# Patient Record
Sex: FEMALE | Race: WHITE | Employment: OTHER | URBAN - METROPOLITAN AREA
[De-identification: names, ages, dates, MRNs, and addresses within clinical notes are randomized per-mention and may not be internally consistent; named-entity substitution may affect disease eponyms.]

---

## 2017-03-23 ENCOUNTER — HOSPITAL ENCOUNTER (OUTPATIENT)
Dept: MAMMOGRAPHY | Age: 62
Discharge: HOME | End: 2017-03-23
Attending: OBSTETRICS & GYNECOLOGY
Payer: COMMERCIAL

## 2017-03-23 DIAGNOSIS — Z12.31 VISIT FOR SCREENING MAMMOGRAM: ICD-10-CM

## 2017-03-23 PROCEDURE — 77067 SCR MAMMO BI INCL CAD: CPT

## 2018-05-09 ENCOUNTER — HOSPITAL ENCOUNTER (OUTPATIENT)
Dept: MAMMOGRAPHY | Age: 63
Discharge: HOME | End: 2018-05-09
Attending: OBSTETRICS & GYNECOLOGY
Payer: COMMERCIAL

## 2018-05-09 DIAGNOSIS — Z12.31 ENCOUNTER FOR SCREENING MAMMOGRAM FOR MALIGNANT NEOPLASM OF BREAST: ICD-10-CM

## 2018-05-09 PROCEDURE — 77067 SCR MAMMO BI INCL CAD: CPT

## 2019-11-22 ENCOUNTER — HOSPITAL ENCOUNTER (OUTPATIENT)
Dept: MAMMOGRAPHY | Age: 64
Discharge: HOME | End: 2019-11-22
Attending: OBSTETRICS & GYNECOLOGY
Payer: COMMERCIAL

## 2019-11-22 DIAGNOSIS — Z12.31 VISIT FOR SCREENING MAMMOGRAM: ICD-10-CM

## 2019-11-22 PROCEDURE — 77063 BREAST TOMOSYNTHESIS BI: CPT

## 2020-11-20 ENCOUNTER — TRANSCRIBE ORDER (OUTPATIENT)
Dept: SCHEDULING | Age: 65
End: 2020-11-20

## 2020-11-20 DIAGNOSIS — Z12.31 VISIT FOR SCREENING MAMMOGRAM: Primary | ICD-10-CM

## 2020-12-15 ENCOUNTER — HOSPITAL ENCOUNTER (OUTPATIENT)
Dept: MAMMOGRAPHY | Age: 65
Discharge: HOME | End: 2020-12-15
Attending: OBSTETRICS & GYNECOLOGY
Payer: MEDICARE

## 2020-12-15 DIAGNOSIS — Z12.31 VISIT FOR SCREENING MAMMOGRAM: ICD-10-CM

## 2020-12-15 PROCEDURE — 77063 BREAST TOMOSYNTHESIS BI: CPT

## 2021-05-25 ENCOUNTER — HOSPITAL ENCOUNTER (OUTPATIENT)
Dept: LAB | Age: 66
Discharge: HOME | End: 2021-05-25

## 2021-05-25 PROCEDURE — 88305 TISSUE EXAM BY PATHOLOGIST: CPT

## 2021-12-16 ENCOUNTER — HOSPITAL ENCOUNTER (OUTPATIENT)
Dept: MAMMOGRAPHY | Age: 66
Discharge: HOME | End: 2021-12-16
Attending: FAMILY MEDICINE
Payer: MEDICARE

## 2021-12-16 DIAGNOSIS — Z78.0 MENOPAUSE: ICD-10-CM

## 2021-12-16 DIAGNOSIS — Z12.31 ENCOUNTER FOR SCREENING MAMMOGRAM FOR MALIGNANT NEOPLASM OF BREAST: ICD-10-CM

## 2021-12-16 PROCEDURE — 77080 DXA BONE DENSITY AXIAL: CPT

## 2021-12-16 PROCEDURE — 77067 SCR MAMMO BI INCL CAD: CPT

## 2021-12-17 NOTE — PROGRESS NOTES
Let patient know bone density test showed osteopenia which is thinning bones but not osteoporosis yet. Be sure to take calcium 500 mg daily and walk for exercise as this helps rebuild bone. We will repeat this in 2 years.

## 2022-03-09 ENCOUNTER — TRANSCRIBE ORDER (OUTPATIENT)
Dept: SCHEDULING | Age: 67
End: 2022-03-09

## 2022-03-09 DIAGNOSIS — Z12.31 SCREENING MAMMOGRAM FOR HIGH-RISK PATIENT: Primary | ICD-10-CM

## 2022-12-30 ENCOUNTER — HOSPITAL ENCOUNTER (OUTPATIENT)
Dept: MAMMOGRAPHY | Age: 67
Discharge: HOME | End: 2022-12-30
Payer: MEDICARE

## 2022-12-30 DIAGNOSIS — Z12.31 ENCOUNTER FOR SCREENING MAMMOGRAM FOR BREAST CANCER: ICD-10-CM

## 2022-12-30 PROCEDURE — 77063 BREAST TOMOSYNTHESIS BI: CPT

## 2023-03-08 ENCOUNTER — OFFICE VISIT (OUTPATIENT)
Dept: FAMILY MEDICINE CLINIC | Facility: CLINIC | Age: 68
End: 2023-03-08

## 2023-03-08 VITALS
WEIGHT: 122 LBS | RESPIRATION RATE: 16 BRPM | OXYGEN SATURATION: 98 % | HEIGHT: 63 IN | DIASTOLIC BLOOD PRESSURE: 85 MMHG | TEMPERATURE: 97.3 F | SYSTOLIC BLOOD PRESSURE: 154 MMHG | BODY MASS INDEX: 21.62 KG/M2 | HEART RATE: 67 BPM

## 2023-03-08 DIAGNOSIS — Z23 NEED FOR PNEUMOCOCCAL VACCINATION: ICD-10-CM

## 2023-03-08 DIAGNOSIS — Z12.31 SCREENING MAMMOGRAM FOR HIGH-RISK PATIENT: ICD-10-CM

## 2023-03-08 DIAGNOSIS — M54.2 NECK PAIN: ICD-10-CM

## 2023-03-08 DIAGNOSIS — Z00.00 MEDICARE ANNUAL WELLNESS VISIT, SUBSEQUENT: Primary | ICD-10-CM

## 2023-03-08 RX ORDER — MELOXICAM 7.5 MG/1
7.5 TABLET ORAL DAILY
Qty: 30 TABLET | Refills: 3 | Status: SHIPPED | OUTPATIENT
Start: 2023-03-08

## 2023-03-08 SDOH — ECONOMIC STABILITY: INCOME INSECURITY: HOW HARD IS IT FOR YOU TO PAY FOR THE VERY BASICS LIKE FOOD, HOUSING, MEDICAL CARE, AND HEATING?: NOT HARD AT ALL

## 2023-03-08 SDOH — ECONOMIC STABILITY: FOOD INSECURITY: WITHIN THE PAST 12 MONTHS, YOU WORRIED THAT YOUR FOOD WOULD RUN OUT BEFORE YOU GOT MONEY TO BUY MORE.: NEVER TRUE

## 2023-03-08 SDOH — ECONOMIC STABILITY: HOUSING INSECURITY
IN THE LAST 12 MONTHS, WAS THERE A TIME WHEN YOU DID NOT HAVE A STEADY PLACE TO SLEEP OR SLEPT IN A SHELTER (INCLUDING NOW)?: NO

## 2023-03-08 SDOH — ECONOMIC STABILITY: FOOD INSECURITY: WITHIN THE PAST 12 MONTHS, THE FOOD YOU BOUGHT JUST DIDN'T LAST AND YOU DIDN'T HAVE MONEY TO GET MORE.: NEVER TRUE

## 2023-03-08 ASSESSMENT — PATIENT HEALTH QUESTIONNAIRE - PHQ9
SUM OF ALL RESPONSES TO PHQ QUESTIONS 1-9: 0
2. FEELING DOWN, DEPRESSED OR HOPELESS: 0
SUM OF ALL RESPONSES TO PHQ QUESTIONS 1-9: 0
SUM OF ALL RESPONSES TO PHQ QUESTIONS 1-9: 0
1. LITTLE INTEREST OR PLEASURE IN DOING THINGS: 0
SUM OF ALL RESPONSES TO PHQ9 QUESTIONS 1 & 2: 0
SUM OF ALL RESPONSES TO PHQ QUESTIONS 1-9: 0

## 2023-03-08 ASSESSMENT — LIFESTYLE VARIABLES
HOW MANY STANDARD DRINKS CONTAINING ALCOHOL DO YOU HAVE ON A TYPICAL DAY: 1 OR 2
HOW OFTEN DO YOU HAVE A DRINK CONTAINING ALCOHOL: 2-3 TIMES A WEEK

## 2023-03-08 NOTE — PROGRESS NOTES
Medicare Annual Wellness Visit    Lucia Bermudez is here for Annual Exam    Assessment & Plan   Medicare annual wellness visit, subsequent  -     External Referral to Ophthalmology  Screening mammogram for high-risk patient  -     MECHELLE DIGITAL SCREEN W OR WO CAD BILATERAL; Future  Need for pneumococcal vaccination  -     Pneumococcal, PCV20, PREVNAR 20, (age 25 yrs+), IM, PF  Neck pain  -     [address] - Physical Therapy, [facility]  -     meloxicam (MOBIC) 7.5 MG tablet; Take 1 tablet by mouth daily, Disp-30 tablet, R-3Normal      Recommendations for Preventive Services Due: see orders and patient instructions/AVS.  Recommended screening schedule for the next 5-10 years is provided to the patient in written form: see Patient Instructions/AVS.     Return in 4 weeks (on 4/5/2023) for Medicare Annual Wellness Visit in 1 year. Subjective   The following acute and/or chronic problems were also addressed today:  Patient has had some left-sided neck pain. It really is more in the top of her shoulder. It does not go down into her arm or hand. She has no numbness or tingling or weakness. Is been going on for several months. Advil only helps a little bit. She has not injured herself in any way. She also has a mole on her right back she would like for me to look at. She is also had some vaginal dryness and she is wondering about over-the-counter remedies for that. Patient will go to physical therapy about her neck and upper supraspinatus area on the left. Meloxicam daily. Side effects risk given and gone over. Follow-up 1 month about that. Keep a watch on her blood pressure. We will recheck that when she returns. Mole on her back appears to be a cherry angioma. We will keep an eye on it. Patient's complete Health Risk Assessment and screening values have been reviewed and are found in Flowsheets.  The following problems were reviewed today and where indicated follow up appointments were made and/or referrals ordered. Positive Risk Factor Screenings with Interventions:                    Vision Screen:  Do you have difficulty driving, watching TV, or doing any of your daily activities because of your eyesight?: No  Have you had an eye exam within the past year?: (!) No  No results found. Interventions: Will make referral to ophthalmology. Advanced Directives:  Do you have a Living Will?: (!) No    Intervention:  has NO advanced directive - information provided                       Objective   Vitals:    03/08/23 0808   BP: (!) 154/85   Pulse: 67   Resp: 16   Temp: 97.3 °F (36.3 °C)   SpO2: 98%   Weight: 122 lb (55.3 kg)   Height: 5' 3\" (1.6 m)      Body mass index is 21.61 kg/m². Neck: neck supple and non tender without mass, no thyromegaly or thyroid nodules, no cervical lymphadenopathy   Pulmonary/Chest: clear to auscultation bilaterally- no wheezes, rales or rhonchi, normal air movement, no respiratory distress  Cardiovascular: normal rate, normal S1 and S2, and no gallops  Patient is very tender and has muscle spasming in the left supraspinatus area. She can fully extend at the shoulder without difficulty. She can flex and extend and turn her neck in all motions. It does not make the pain worse. Half of a pencil eraser sized red mole on right mid back that appears benign. No Known Allergies  Prior to Visit Medications    Medication Sig Taking?  Authorizing Provider   meloxicam (MOBIC) 7.5 MG tablet Take 1 tablet by mouth daily Yes Jimmie Jack MD       CareTeam (Including outside providers/suppliers regularly involved in providing care):   Patient Care Team:  Jimmie Jack MD as PCP - Lora Pete MD as PCP - Empaneled Provider     Reviewed and updated this visit:  Allergies  Meds  Med Hx  Surg Hx  Soc Hx  Fam Hx             Jimmie Jack MD

## 2023-03-08 NOTE — PATIENT INSTRUCTIONS
Learning About Vision Tests  What are vision tests? The four most common vision tests are visual acuity tests, refraction, visual field tests, and color vision tests. Visual acuity (sharpness) tests  These tests are used: To see if you need glasses or contact lenses. To monitor an eye problem. To check an eye injury. Visual acuity tests are done as part of routine exams. You may also have this test when you get your 's license or apply for some types of jobs. Visual field tests  These tests are used: To check for vision loss in any area of your range of vision. To screen for certain eye diseases. To look for nerve damage after a stroke, head injury, or other problem that could reduce blood flow to the brain. Refraction and color tests  A refraction test is done to find the right prescription for glasses and contact lenses. A color vision test is done to check for color blindness. Color vision is often tested as part of a routine exam. You may also have this test when you apply for a job where recognizing different colors is important, such as , electronics, or the [employer]. How are vision tests done? Visual acuity test   You cover one eye at a time. You read aloud from a wall chart across the room. You read aloud from a small card that you hold in your hand. Refraction   You look into a special device. The device puts lenses of different strengths in front of each eye to see how strong your glasses or contact lenses need to be. Visual field tests   Your doctor may have you look through special machines. Or your doctor may simply have you stare straight ahead while they move a finger into and out of your field of vision. Color vision test   You look at pieces of printed test patterns in various colors. You say what number or symbol you see. Your doctor may have you trace the number or symbol using a pointer. How do these tests feel?   There is very little chance of having a problem from this test. If dilating drops are used for a vision test, they may make the eyes sting and cause a medicine taste in the mouth. Follow-up care is a key part of your treatment and safety. Be sure to make and go to all appointments, and call your doctor if you are having problems. It's also a good idea to know your test results and keep a list of the medicines you take. Where can you learn more? Go to http://www.meier.com/ and enter G551 to learn more about \"Learning About Vision Tests. \"  Current as of: October 12, 2022               Content Version: 13.5  © 1889-4718 [employer]. Care instructions adapted under license by [facility] ([facility]). If you have questions about a medical condition or this instruction, always ask your healthcare professional. [address] any warranty or liability for your use of this information. Advance Directives: Care Instructions  Overview  An advance directive is a legal way to state your wishes at the end of your life. It tells your family and your doctor what to do if you can't say what you want. There are two main types of advance directives. You can change them any time your wishes change. Living will. This form tells your family and your doctor your wishes about life support and other treatment. The form is also called a declaration. Medical power of . This form lets you name a person to make treatment decisions for you when you can't speak for yourself. This person is called a health care agent (health care proxy, health care surrogate). The form is also called a durable power of  for health care. If you do not have an advance directive, decisions about your medical care may be made by a family member, or by a doctor or a  who doesn't know you. It may help to think of an advance directive as a gift to the people who care for you.  If you have one, they won't have to make tough decisions by themselves. For more information, including forms for your state, see the [address] website (www.caringinfo.org/planning/advance-directives/). Follow-up care is a key part of your treatment and safety. Be sure to make and go to all appointments, and call your doctor if you are having problems. It's also a good idea to know your test results and keep a list of the medicines you take. What should you include in an advance directive? Many states have a unique advance directive form. (It may ask you to address specific issues.) Or you might use a universal form that's approved by many states. If your form doesn't tell you what to address, it may be hard to know what to include in your advance directive. Use the questions below to help you get started. Who do you want to make decisions about your medical care if you are not able to? What life-support measures do you want if you have a serious illness that gets worse over time or can't be cured? What are you most afraid of that might happen? (Maybe you're afraid of having pain, losing your independence, or being kept alive by machines.)  Where would you prefer to die? (Your home? A hospital? A nursing home?)  Do you want to donate your organs when you die? Do you want certain Spiritism practices performed before you die? When should you call for help? Be sure to contact your doctor if you have any questions. Where can you learn more? Go to http://www.meier.com/ and enter R264 to learn more about \"Advance Directives: Care Instructions. \"  Current as of: June 16, 2022               Content Version: 13.5  © 7870-9152 [employer]. Care instructions adapted under license by [employer]. If you have questions about a medical condition or this instruction, always ask your healthcare professional. [address] any warranty or liability for your use of this information.            A Healthy Heart: Care Instructions  Your Care Instructions     Coronary artery disease, also called heart disease, occurs when a substance called plaque builds up in the vessels that supply oxygen-rich blood to your heart muscle. This can narrow the blood vessels and reduce blood flow. A heart attack happens when blood flow is completely blocked. A high-fat diet, smoking, and other factors increase the risk of heart disease. Your doctor has found that you have a chance of having heart disease. You can do lots of things to keep your heart healthy. It may not be easy, but you can change your diet, exercise more, and quit smoking. These steps really work to lower your chance of heart disease. Follow-up care is a key part of your treatment and safety. Be sure to make and go to all appointments, and call your doctor if you are having problems. It's also a good idea to know your test results and keep a list of the medicines you take. How can you care for yourself at home? Diet    Use less salt when you cook and eat. This helps lower your blood pressure. Taste food before salting. Add only a little salt when you think you need it. With time, your taste buds will adjust to less salt.     Eat fewer snack items, fast foods, canned soups, and other high-salt, high-fat, processed foods.     Read food labels and try to avoid saturated and trans fats. They increase your risk of heart disease by raising cholesterol levels.     Limit the amount of solid fat-butter, margarine, and shortening-you eat. Use olive, peanut, or canola oil when you cook. Bake, broil, and steam foods instead of frying them.     Eat a variety of fruit and vegetables every day. Dark green, deep orange, red, or yellow fruits and vegetables are especially good for you. Examples include spinach, carrots, peaches, and berries.     Foods high in fiber can reduce your cholesterol and provide important vitamins and minerals.  High-fiber foods include whole-grain cereals and breads, oatmeal, beans, brown rice, citrus fruits, and apples.     Eat lean proteins. Heart-healthy proteins include seafood, lean meats and poultry, eggs, beans, peas, nuts, seeds, and soy products.     Limit drinks and foods with added sugar. These include candy, desserts, and soda pop. Lifestyle changes    If your doctor recommends it, get more exercise. Walking is a good choice. Bit by bit, increase the amount you walk every day. Try for at least 30 minutes on most days of the week. You also may want to swim, bike, or do other activities.     Do not smoke. If you need help quitting, talk to your doctor about stop-smoking programs and medicines. These can increase your chances of quitting for good. Quitting smoking may be the most important step you can take to protect your heart. It is never too late to quit.     Limit alcohol to 2 drinks a day for men and 1 drink a day for women. Too much alcohol can cause health problems.     Manage other health problems such as diabetes, high blood pressure, and high cholesterol. If you think you may have a problem with alcohol or drug use, talk to your doctor. Medicines    Take your medicines exactly as prescribed. Call your doctor if you think you are having a problem with your medicine.     If your doctor recommends aspirin, take the amount directed each day. Make sure you take aspirin and not another kind of pain reliever, such as acetaminophen (Tylenol). When should you call for help? Call 911 if you have symptoms of a heart attack. These may include:    Chest pain or pressure, or a strange feeling in the chest.     Sweating.     Shortness of breath.     Pain, pressure, or a strange feeling in the back, neck, jaw, or upper belly or in one or both shoulders or arms.     Lightheadedness or sudden weakness.     A fast or irregular heartbeat. After you call 911, the  may tell you to chew 1 adult-strength or 2 to 4 low-dose aspirin. Wait for an ambulance.  Do not try to drive yourself. Watch closely for changes in your health, and be sure to contact your doctor if you have any problems. Where can you learn more? Go to http://www.meier.com/ and enter F075 to learn more about \"A Healthy Heart: Care Instructions. \"  Current as of: September 7, 2022               Content Version: 13.5  © 2006-2022 [employer]. Care instructions adapted under license by [facility] ([facility]). If you have questions about a medical condition or this instruction, always ask your healthcare professional. [address] any warranty or liability for your use of this information. Personalized Preventive Plan for Magda Gautam - 3/8/2023  Medicare offers a range of preventive health benefits. Some of the tests and screenings are paid in full while other may be subject to a deductible, co-insurance, and/or copay. Some of these benefits include a comprehensive review of your medical history including lifestyle, illnesses that may run in your family, and various assessments and screenings as appropriate. After reviewing your medical record and screening and assessments performed today your provider may have ordered immunizations, labs, imaging, and/or referrals for you. A list of these orders (if applicable) as well as your Preventive Care list are included within your After Visit Summary for your review. Other Preventive Recommendations:    A preventive eye exam performed by an eye specialist is recommended every 1-2 years to screen for glaucoma; cataracts, macular degeneration, and other eye disorders. A preventive dental visit is recommended every 6 months. Try to get at least 150 minutes of exercise per week or 10,000 steps per day on a pedometer . Order or download the FREE \"Exercise & Physical Activity: Your Everyday Guide\" from [employer]. Call 8-356.609.9431 or search [employer] online.   You need 9719-2493 mg of calcium and 2037-7645 IU of vitamin D per day. It is possible to meet your calcium requirement with diet alone, but a vitamin D supplement is usually necessary to meet this goal.  When exposed to the sun, use a sunscreen that protects against both UVA and UVB radiation with an SPF of 30 or greater. Reapply every 2 to 3 hours or after sweating, drying off with a towel, or swimming. Always wear a seat belt when traveling in a car. Always wear a helmet when riding a bicycle or motorcycle.

## 2023-03-10 ENCOUNTER — HOSPITAL ENCOUNTER (OUTPATIENT)
Dept: PHYSICAL THERAPY | Age: 68
Setting detail: RECURRING SERIES
Discharge: HOME | End: 2023-03-13
Payer: MEDICARE

## 2023-03-10 PROCEDURE — 97140 MANUAL THERAPY 1/> REGIONS: CPT

## 2023-03-10 PROCEDURE — 97161 PT EVAL LOW COMPLEX 20 MIN: CPT

## 2023-03-10 PROCEDURE — 97110 THERAPEUTIC EXERCISES: CPT

## 2023-03-10 ASSESSMENT — PAIN SCALES - GENERAL: PAINLEVEL_OUTOF10: 1

## 2023-03-10 NOTE — THERAPY EVALUATION
Patrick Miguel  : 1955  Primary: Medicare Part A And B (Medicare)  Secondary: [address] @ [address] [facility] [address]  Phone: [phone]  Fax: [phone] Plan Frequency: 1-2 visits per week for 6 weeks  Plan of Care/Certification Expiration Date: 23    PT Visit Info:  Plan Frequency: 1-2 visits per week for 6 weeks  Plan of Care/Certification Expiration Date: 23    Visit Count:  1                OUTPATIENT PHYSICAL THERAPY:             OP NOTE TYPE: Initial Assessment 3/10/2023               Episode (Neck pain) Appt Desk         Treatment Diagnosis:  Cervicalgia (M54.2)  Abnormal posture (R29.3)  Medical/Referring Diagnosis:  Neck pain [M54.2]  Referring Physician:  Corine Dillon MD MD Orders:  PT Eval and Treat   Return MD Appt:  TBD  Date of Onset:  Onset Date:  (6 months ago)    Allergies:  Patient has no known allergies. Restrictions/Precautions:    Restrictions/Precautions: None      Medications Last Reviewed:  3/10/2023     SUBJECTIVE   History of Injury/Illness (Reason for Referral): Insidious onset roughly 6 months ago, but did slip around [place] on some stairs while helping a friend and felt that it aggravated it. Does  yoga or pilates every morning and noticed that it was sore. Looking up after looking down to read has pain. Feels a knot on the L side. No numbness/tingling in hands, but has Reynaud's. Not terribly functionally limiting but is aggravating. Feels worse when stressed. Patient is R handed. Sometimes has difficulty sleeping/staying asleep due to discomfort. Patient Stated Goal(s):  \"Reduce discomfort\"  Initial:     1/10 Post Session:      (None reported, \"Feels better')/10  Past Medical History/Comorbidities: per EMR  Ms. Jessica Mina  has a past medical history of Raynaud disease. Ms. Jessica Mina  has a past surgical history that includes Breast biopsy (Right, ).   Social History/Living Environment:   Lives With: Spouse  Type of Home: House  Home Layout: Two level     Prior Level of Function/Work/Activity:   Prior level of function: Independent  Occupation: Retired     Education: >4 years of college     Learning:   Does the patient/guardian have any barriers to learning?: No barriers  Will there be a co-learner?: No  What is the preferred language of the patient/guardian?: English  Is an  required?: No  How does the patient/guardian prefer to learn new concepts?: Listening; Reading; Demonstration; Pictures/Videos     Fall Risk Scale: Spencer Total Score: 0  Spencer Fall Risk: Low (0-24)           OBJECTIVE   Observations:    Description: Scapular motion appears symmetrical with visual observation with active abduction and flexion to end-range bilaterally. Slightly kyphotic posture. Palpation:  Tenderness and tightness noted to L upper trapezius with palpable muscle knot. Tender to joint mobilizations at C3-C4 to L side. Stiff with posterior-anterior joint mobilizations at T2-T3-T4-T5. Cervical:  AROM Cervical Spine   Cervical spine general AROM: Rotation restricted approxomately 30% to the left; approximately 20% to the right. Flexion and extension are within functional limits. Thoracic:  AROM Thoracic Spine   Thoracic Spine AROM : [facility]  Thoracic spine general AROM: Seated thoracic rotation symmetrical to each side. Extension and flexion are within normal ranges.   Cervical and UE Strength  L Shoulder Flexion: 5/5  L Shoulder ABduction: 5/5  L Shoulder Internal Rotation: 5/5  L Shoulder External Rotation: 5/5  Shoulder:  AROM Shoulder (Degrees)  R Shoulder Flexion (0-180): 155  R Shoulder ABduction (0-180): 160  R Shoulder Int Rotation  (0-70): T9-T10  R Shoulder Ext Rotation (0-90): T1-T2  L Shoulder Flexion (0-180): 155  L Shoulder ABduction (0-180): 165  L Shoulder Int Rotation  (0-70): T8-T9  L Shoulder Ext Rotation  (0-90): T1-T2  Shoulder Elbow Strength Testing (MMT)  L Shoulder Flexion: 5/5  L Shoulder ABduction: 5/5  L Shoulder Internal Rotation: 5/5  L Shoulder External Rotation: 5/5  ASSESSMENT   Initial Assessment:  Patient presents to PT with chief complaint of L-sided neck pain that aggravates her when performing everyday activities. She exhibits a palpable trigger point to L upper trapezius, slight reduction in cervical range of motion and some segmental mobility limitations with thoracic mobilizations. She is likely to benefit from PT intervention to reduce tightness to L upper trapezius and improve her quality of life. Problem List: (Impacting functional limitations): Body Structures, Functions, Activity Limitations Requiring Skilled Therapeutic Intervention: Decreased ROM; Increased pain; Decreased posture     Therapy Prognosis:   Therapy Prognosis: Good     Initial Assessment Complexity:   Decision Making: Low Complexity    PLAN   Effective Dates: 3/10/23 TO Plan of Care/Certification Expiration Date: 04/21/23   Frequency/Duration: Plan Frequency: 1-2 visits per week for 6 weeks   Interventions Planned (Treatment may consist of any combination of the following):    Current Treatment Recommendations: Strengthening; ROM; Dry needling; Modalities; Neuromuscular re-education; Manual; Home exercise program; Patient/Caregiver education & training     Goals: (Goals have been discussed and agreed upon with patient.)    Discharge Goals: Time Frame: 6 weeks  Patient will have symmetrical cervical rotation ROM, and be able to look over each shoulder with functional range of motion. Patient will be independent with [facility] for symptom management. Patient will be able to perform yoga and/or pilates without limitation from neck pain. Outcome Measure: Tool Used: Neck Disability Index (NDI)  Score:  Initial: 4/50  Most Recent: X/50 (Date: -- )   Interpretation of Score:  The Neck Disability Index is a revised form of the Oswestry Low Back Pain Index and is designed to measure the activities of daily living in person's with neck pain. Each section is scored on a 0-5 scale, 5 representing the greatest disability. The scores of each section are added together for a total score of 50. Medical Necessity:   > Skilled intervention continues to be required due to L-sided neck/upper quarter pain. Reason For Services/Other Comments:  > Patient continues to require skilled intervention due to neck pain. Total Duration: 50 minutesT  Time In: 0900  Time Out: 5767    Regarding Lorenaraymond Saul's therapy, I certify that the treatment plan above will be carried out by a therapist or under their direction.   Thank you for this referral,  Jeannette Gomez, PT     Referring Physician Signature: Erick Conklin MD _______________________________ Date _____________        Post Session Pain  Charge Capture  PT Visit Info MD Guidelines  Meng

## 2023-03-12 NOTE — PROGRESS NOTES
Brett Anne  : 1955  Primary: Medicare Part A And B (Medicare)  Secondary: [address] @ [address] [facility] [address]  Phone: [phone]  Fax: [phone] Plan Frequency: 1-2 visits per week for 6 weeks    Plan of Care/Certification Expiration Date: 23      PT Visit Info:  Plan Frequency: 1-2 visits per week for 6 weeks  Plan of Care/Certification Expiration Date: 23      Visit Count:  1    OUTPATIENT PHYSICAL THERAPY:OP NOTE TYPE: Treatment Note 3/10/2023       Episode  }Appt Desk             Treatment Diagnosis:  Cervicalgia (M54.2)  Abnormal posture (R29.3)  Medical/Referring Diagnosis:  Neck pain [M54.2]  Referring Physician:  Lanie Crigler, MD MD Orders:  PT Eval and Treat   Date of Onset:  Onset Date:  (6 months ago)     Allergies:   Patient has no known allergies. Restrictions/Precautions:  Restrictions/Precautions: None  No data recorded     Interventions Planned (Treatment may consist of any combination of the following):    Current Treatment Recommendations: Strengthening; ROM; Dry needling; Modalities; Neuromuscular re-education; Manual; Home exercise program; Patient/Caregiver education & training     Subjective Comments:  See evaluation note for details. Initial:}    1/10Post Session:        (None reported, \"Feels better')/10  Medications Last Reviewed:  3/10/2023  Updated Objective Findings:  See evaluation note from today  Treatment   THERAPEUTIC EXERCISE: (10 minutes):    Exercises per grid below to improve  tissue pliability, reduce L upper trapezius tightness and improve scapular positioning . Required minimal verbal and manual cues to promote proper body posture and promote proper body mechanics. Progressed repetitions as indicated.   - L upper trapezius stretch in supine; contract-relax to L upper trapezius in side-lying  - L sided scapular diagonals for reduced upper trapezius tightness  MANUAL THERAPY: (15 minutes):   Joint mobilization and Soft tissue mobilization was utilized and necessary because of the patient's restricted motion of soft tissue and 1st rib hypomobility . Soft tissue mobilizations to L upper trapezius and L sided cervical paraspinals. Grade 3-4 mobilizations to improve L sided 1st rib mobility. Date:  3/10/23 Date:   Date:     Activity/Exercise Parameters Parameters Parameters   Upper trapezius stretch Seated, 3 x 20\"     Lower trapezius stretch Seated  3 x 20\"     Scapular retractions 2 x 10                                 Treatment/Session Summary:    Treatment Assessment:  Patient did well with exercises. Tight to L upper trapezius with palpable trigger point. Communication/Consultation:  None today  Equipment provided today:  None  Recommendations/Intent for next treatment session: Next visit will focus on improving L sided neck pain.     Total Treatment Billable Duration:  25 minutes for treatment + 25 minutes for evaluation  Time In: 0900  Time Out: 0950    Willi Duran, PT       Charge Capture  }Post Session Pain  PT Visit Info  MedBridge Portal  MD Guidelines  Scanned Media  Benefits  MyChart    Future Appointments   Date Time Provider Kennedy Paiz   3/16/2023  8:00 AM Ananth Aelshia, PTA [facility]   3/17/2023  8:00 AM Sonjaandre Claytonada, PT [facility]   3/20/2023  8:00 AM Sonja Harada, PT [facility]   3/23/2023  8:00 AM Sonja Harada, PT [facility]   3/30/2023  8:00 AM Ananth Aleshia, PTA [facility]   3/31/2023  8:00 AM Sonja Harada, PT [facility]   4/3/2023  8:00 AM Ananth Aleshia, PTA [facility]   4/5/2023  8:45 AM MD MAGDY Lara [facility]   4/6/2023  9:00 AM Tamela Harada, PT [facility]   3/8/2024  8:00 AM MD MAGDY Lara [facility] AMB

## 2023-03-16 ENCOUNTER — HOSPITAL ENCOUNTER (OUTPATIENT)
Dept: PHYSICAL THERAPY | Age: 68
Setting detail: RECURRING SERIES
Discharge: HOME | End: 2023-03-19
Payer: MEDICARE

## 2023-03-16 PROCEDURE — 97140 MANUAL THERAPY 1/> REGIONS: CPT

## 2023-03-16 PROCEDURE — 97035 APP MDLTY 1+ULTRASOUND EA 15: CPT

## 2023-03-16 PROCEDURE — 97110 THERAPEUTIC EXERCISES: CPT

## 2023-03-16 ASSESSMENT — PAIN SCALES - GENERAL: PAINLEVEL_OUTOF10: 2

## 2023-03-17 ENCOUNTER — HOSPITAL ENCOUNTER (OUTPATIENT)
Dept: PHYSICAL THERAPY | Age: 68
Setting detail: RECURRING SERIES
Discharge: HOME | End: 2023-03-20
Payer: MEDICARE

## 2023-03-17 ENCOUNTER — TELEPHONE (OUTPATIENT)
Dept: FAMILY MEDICINE CLINIC | Facility: CLINIC | Age: 68
End: 2023-03-17

## 2023-03-17 PROCEDURE — 97140 MANUAL THERAPY 1/> REGIONS: CPT

## 2023-03-17 PROCEDURE — 97110 THERAPEUTIC EXERCISES: CPT

## 2023-03-17 NOTE — TELEPHONE ENCOUNTER
Did speak to the patient regarding the mole that I saw on her back the week before last.  I would like for her to return to have it removed. She will call and get an appointment.

## 2023-03-17 NOTE — PROGRESS NOTES
tightness    MANUAL THERAPY: ( x 15  minutes):   Joint mobilization and Soft tissue mobilization was utilized and necessary because of the patient's restricted motion of soft tissue and 1st rib hypomobility . Soft tissue mobilizations to L upper trapezius and L sided cervical paraspinals. Date:  3/10/23 Date:  3/16/23 Date:     Activity/Exercise Parameters Parameters Parameters   Upper trapezius stretch Seated, 3 x 20\" Seated 4x30 sec hold     Levator scapulae stretch Seated  3 x 20\" Seated 4x30 sec hold each    Scapular retractions 2 x 10 2x10 reps     Pec stretch   At door way 4x30 sec hold     Shoulder shrugs  X 20 reps gently    Backward shoulder rolls   X 20 reps gently    Education   With all exercises emphasizing proper technique, body mechanics, and posture. Treatment/Session Summary:    Treatment Assessment  Improved cervical ROM as compared to initial evaluation. Continues to have palpable knot to L upper trapezius but less painful today with all movements. Communication/Consultation:  None today  Equipment provided today:  None  Recommendations/Intent for next treatment session: Next visit will focus on improving L sided neck pain.     Total Treatment Billable Duration:  50 minutes    Time In: 0800  Time Out: 0850    Dalphine Phalen, PT       Charge Capture  }Post Session Pain  PT Visit Info  MedBridge Portal  MD Guidelines  Scanned Media  Benefits  MyChart    Future Appointments   Date Time Provider Kennedy Paiz   3/20/2023  8:00 AM Vernestine Miners, PT [facility]   3/23/2023  8:00 AM Vernestine Miners, PT [facility]   3/30/2023  8:00 AM Gail Alt, PTA [facility]   3/31/2023  8:00 AM Vernestine Miners, PT [facility]   4/3/2023  8:00 AM Gail Alt, PTA [facility]   4/5/2023  8:45 AM MD MAGDY Fernández [facility] AMB   4/6/2023  9:00 AM Vernestine Miners, PT [facility]   3/8/2024  8:00 AM MD MAGDY Fernández [facility] AMB

## 2023-03-20 ENCOUNTER — HOSPITAL ENCOUNTER (OUTPATIENT)
Dept: PHYSICAL THERAPY | Age: 68
Setting detail: RECURRING SERIES
Discharge: HOME | End: 2023-03-23
Payer: MEDICARE

## 2023-03-20 PROCEDURE — 97140 MANUAL THERAPY 1/> REGIONS: CPT

## 2023-03-20 PROCEDURE — 97110 THERAPEUTIC EXERCISES: CPT

## 2023-03-20 ASSESSMENT — PAIN SCALES - GENERAL: PAINLEVEL_OUTOF10: 0

## 2023-03-23 ENCOUNTER — HOSPITAL ENCOUNTER (OUTPATIENT)
Dept: PHYSICAL THERAPY | Age: 68
Setting detail: RECURRING SERIES
Discharge: HOME | End: 2023-03-26
Payer: MEDICARE

## 2023-03-23 PROCEDURE — 97110 THERAPEUTIC EXERCISES: CPT

## 2023-03-23 PROCEDURE — 97140 MANUAL THERAPY 1/> REGIONS: CPT

## 2023-03-23 ASSESSMENT — PAIN SCALES - GENERAL: PAINLEVEL_OUTOF10: 1

## 2023-03-23 NOTE — PROGRESS NOTES
3/31/2023  8:00 AM Belinda Ion, PT [facility]   4/3/2023  8:00 AM Blanca Rondon, PTA [facility]   4/4/2023  8:45 AM MD MAGDY Reyes AMB   4/6/2023  9:00 AM Belinda Ion, PT [facility]   3/8/2024  8:00 AM MD MAGDY Reyes AMB

## 2023-03-30 ENCOUNTER — HOSPITAL ENCOUNTER (OUTPATIENT)
Dept: PHYSICAL THERAPY | Age: 68
Setting detail: RECURRING SERIES
End: 2023-03-30
Payer: MEDICARE

## 2023-03-30 PROCEDURE — 97140 MANUAL THERAPY 1/> REGIONS: CPT

## 2023-03-30 PROCEDURE — 97110 THERAPEUTIC EXERCISES: CPT

## 2023-03-30 ASSESSMENT — PAIN SCALES - GENERAL: PAINLEVEL_OUTOF10: 1

## 2023-03-30 NOTE — PROGRESS NOTES
Kristofer Piper  : 1955  Primary: Medicare Part A And B (Medicare)  Secondary: [address] at the [facility] [address]  Phone: [phone]  Fax: [phone] Plan Frequency: 1-2 visits per week for 6 weeks    Plan of Care/Certification Expiration Date: 23      PT Visit Info:  Plan Frequency: 1-2 visits per week for 6 weeks  Plan of Care/Certification Expiration Date: 23      Visit Count:  6    OUTPATIENT PHYSICAL THERAPY:OP NOTE TYPE: Treatment Note 3/30/2023       Episode  }Appt Desk             Treatment Diagnosis:  Cervicalgia (M54.2)  Abnormal posture (R29.3)  Medical/Referring Diagnosis:  Neck pain [M54.2]  Referring Physician:  Dilcia Melo MD MD Orders:  PT Eval and Treat   Date of Onset:  Onset Date:  (6 months ago)     Allergies:   Patient has no known allergies. Restrictions/Precautions:  Restrictions/Precautions: None  No data recorded     Interventions Planned (Treatment may consist of any combination of the following):    Current Treatment Recommendations: Strengthening; ROM; Dry needling; Modalities; Neuromuscular re-education; Manual; Home exercise program; Patient/Caregiver education & training     Subjective Comments:  Pt. reported less pain and feeling much better  Initial:}    1/10Post Session:       1/10  Medications Last Reviewed:  3/30/2023  Updated Objective Findings:  decreased muscular tightness   Treatment   THERAPEUTIC EXERCISE: (40 minutes):    Exercises per grid below to improve  tissue pliability, reduce L upper trapezius tightness and improve scapular positioning . Required minimal verbal and manual cues to promote proper body posture and promote proper body mechanics. Progressed repetitions as indicated.   - L upper trapezius stretch in supine; contract-relax to L upper trapezius in side-lying  - L sided scapular diagonals for reduced upper trapezius tightness    MANUAL THERAPY: 3/31/2023  8:00 AM Jeremias Coon, PT [facility]   4/3/2023  8:00 AM Shasha Calderon, PTA [facility]   4/4/2023  8:45 AM MD MAGDY Spicer AMB   4/6/2023  9:00 AM Jeremias Coon, PT [facility]   3/8/2024  8:00 AM MD MAGDY Spicer [facility] AM[facility]

## 2023-03-31 ENCOUNTER — HOSPITAL ENCOUNTER (OUTPATIENT)
Dept: PHYSICAL THERAPY | Age: 68
Setting detail: RECURRING SERIES
End: 2023-03-31
Payer: MEDICARE

## 2023-03-31 PROCEDURE — 97140 MANUAL THERAPY 1/> REGIONS: CPT

## 2023-03-31 PROCEDURE — 97110 THERAPEUTIC EXERCISES: CPT

## 2023-03-31 ASSESSMENT — PAIN SCALES - GENERAL: PAINLEVEL_OUTOF10: 1

## 2023-03-31 NOTE — PROGRESS NOTES
Alma Delia Corley  : 1955  Primary: Medicare Part A And B (Medicare)  Secondary: [address] @ [address] [place] [address]  Phone: [phone]  Fax: [phone] Plan Frequency: 1-2 visits per week for 6 weeks      Plan of Care/Certification Expiration Date: 23        PT Visit Info:  Plan Frequency: 1-2 visits per week for 6 weeks  Plan of Care/Certification Expiration Date: 23        Visit Count:  7    OUTPATIENT PHYSICAL THERAPY:OP NOTE TYPE: Treatment Note 3/31/2023       Episode  }Appt Desk             Treatment Diagnosis:  Cervicalgia (M54.2)  Abnormal posture (R29.3)  Medical/Referring Diagnosis:  Neck pain [M54.2]  Referring Physician:  Cecilia Gonsalez MD MD Orders:  PT Eval and Treat   Date of Onset:  Onset Date:  (6 months ago)       Allergies:   Patient has no known allergies. Restrictions/Precautions:  Restrictions/Precautions: None    No data recorded     Interventions Planned (Treatment may consist of any combination of the following):    Current Treatment Recommendations: Strengthening; ROM; Dry needling; Modalities; Neuromuscular re-education; Manual; Home exercise program; Patient/Caregiver education & training       Subjective Comments:  Patient states that her neck is feeling better. Has made good progress. Initial:}    1/10Post Session:        (Not rated)/10  Medications Last Reviewed:  3/31/2023  Updated Objective Findings:  Reduced L cervical active rotation   Treatment   THERAPEUTIC EXERCISE: (30 minutes):    Exercises per grid below to improve  tissue pliability, reduce L upper trapezius tightness and improve scapular positioning . Required minimal verbal and manual cues to promote proper body posture and promote proper body mechanics. Progressed repetitions as indicated.   - L upper trapezius stretch in supine; contract-relax to L upper trapezius in side-lying  - L sided scapular diagonals

## 2023-04-03 ENCOUNTER — HOSPITAL ENCOUNTER (OUTPATIENT)
Dept: PHYSICAL THERAPY | Age: 68
Setting detail: RECURRING SERIES
Discharge: HOME | End: 2023-04-06
Payer: MEDICARE

## 2023-04-03 PROCEDURE — 97140 MANUAL THERAPY 1/> REGIONS: CPT

## 2023-04-03 PROCEDURE — 97110 THERAPEUTIC EXERCISES: CPT

## 2023-04-03 ASSESSMENT — PAIN SCALES - GENERAL: PAINLEVEL_OUTOF10: 1

## 2023-04-03 NOTE — PROGRESS NOTES
Backward shoulder rolls  X 30 reps   X 20 reps     Cervical nods 3 x 10, supine 3 x10, supine Seated 3x10     Shoulder extensions Bilateral  3 x 10, blue Bilateral  3 x 10, blue Bilateral blue 3x10     Middle trap raises  2# 3 x 10 L 2 lb weights 3x10 reps left 2# 3 x 10 L   Lower trap raises  1# 2 x 10 L 2 lb wt. s x 10 reps  2# 3 x 10 L         Treatment/Session Summary:    Treatment Assessment   Pt. Reported no pain at the end of session. Pt.stated the moist hot pack and estim helped a lot. Communication/Consultation:  None today  Equipment provided today:  None  Recommendations/Intent for next treatment session: Next visit will focus on improving L sided neck pain.     Total Treatment Billable Duration:  55 minutes    Time In: 0800  Time Out: 0910    LAVON IVAN PTA       Charge Capture  }Post Session Pain  PT Visit Info  [employer] Consumer Portal  MD Guidelines  Scanned Media  Benefits  MyChart    Future Appointments   Date Time Provider Kennedy Paiz   4/6/2023  9:00 AM Anthony Rae, PT [facility]   4/10/2023  3:00 PM MD MAGDY Benito AMB   3/8/2024  8:00 AM MD MAGDY Benito [facility] AMB

## 2023-04-19 ENCOUNTER — OFFICE VISIT (OUTPATIENT)
Dept: FAMILY MEDICINE CLINIC | Facility: CLINIC | Age: 68
End: 2023-04-19

## 2023-04-19 VITALS
HEART RATE: 67 BPM | DIASTOLIC BLOOD PRESSURE: 92 MMHG | HEIGHT: 63 IN | BODY MASS INDEX: 21.9 KG/M2 | RESPIRATION RATE: 16 BRPM | TEMPERATURE: 97.2 F | WEIGHT: 123.6 LBS | SYSTOLIC BLOOD PRESSURE: 148 MMHG | OXYGEN SATURATION: 99 %

## 2023-04-19 DIAGNOSIS — D18.01 HEMANGIOMA OF SKIN: Primary | ICD-10-CM

## 2023-04-19 DIAGNOSIS — E61.1 LOW IRON: ICD-10-CM

## 2023-04-19 ASSESSMENT — PATIENT HEALTH QUESTIONNAIRE - PHQ9
1. LITTLE INTEREST OR PLEASURE IN DOING THINGS: 0
SUM OF ALL RESPONSES TO PHQ QUESTIONS 1-9: 0
SUM OF ALL RESPONSES TO PHQ QUESTIONS 1-9: 0
SUM OF ALL RESPONSES TO PHQ9 QUESTIONS 1 & 2: 0
2. FEELING DOWN, DEPRESSED OR HOPELESS: 0
SUM OF ALL RESPONSES TO PHQ QUESTIONS 1-9: 0
SUM OF ALL RESPONSES TO PHQ QUESTIONS 1-9: 0

## 2023-04-19 NOTE — PROGRESS NOTES
per Week: 7 days    Minutes of Exercise per Session: 30 min   Stress: Not on file   Social Connections: Not on file   Intimate Partner Violence: Not on file   Housing Stability: Unknown    Unable to Pay for Housing in the Last Year: Not on file    Number of Jaceklyn in the Last Year: Not on file    Unstable Housing in the Last Year: No       No Known Allergies    BP (!) 148/92   Pulse 67   Temp 97.2 °F (36.2 °C)   Resp 16   Ht 5' 3\" (1.6 m)   Wt 123 lb 9.6 oz (56.1 kg)   SpO2 99%   BMI 21.89 kg/m²     Back: Biopsy site has a suture intact which was removed without problems. Albert Harden was seen today for suture / staple removal.    Diagnoses and all orders for this visit:    Hemangioma of skin    Low iron  -     CBC with Auto Differential; Future    Moderma to the area of her biopsy. When she goes to give blood again if her hemoglobin is low she will return for CBC here and we will make determination from there.     Azul Dickerson MD

## 2023-12-08 ENCOUNTER — OFFICE VISIT (OUTPATIENT)
Dept: FAMILY MEDICINE CLINIC | Facility: CLINIC | Age: 68
End: 2023-12-08

## 2023-12-08 VITALS
SYSTOLIC BLOOD PRESSURE: 130 MMHG | WEIGHT: 124 LBS | TEMPERATURE: 97.7 F | OXYGEN SATURATION: 99 % | HEIGHT: 63 IN | HEART RATE: 52 BPM | DIASTOLIC BLOOD PRESSURE: 103 MMHG | RESPIRATION RATE: 16 BRPM | BODY MASS INDEX: 21.97 KG/M2

## 2023-12-08 DIAGNOSIS — M25.561 CHRONIC PAIN OF RIGHT KNEE: Primary | ICD-10-CM

## 2023-12-08 DIAGNOSIS — G89.29 CHRONIC PAIN OF LEFT KNEE: ICD-10-CM

## 2023-12-08 DIAGNOSIS — G89.29 CHRONIC PAIN OF RIGHT KNEE: Primary | ICD-10-CM

## 2023-12-08 DIAGNOSIS — M25.562 CHRONIC PAIN OF LEFT KNEE: ICD-10-CM

## 2023-12-08 RX ORDER — MELOXICAM 7.5 MG/1
7.5 TABLET ORAL 2 TIMES DAILY
Qty: 60 TABLET | Refills: 1 | Status: SHIPPED | OUTPATIENT
Start: 2023-12-08

## 2023-12-08 ASSESSMENT — PATIENT HEALTH QUESTIONNAIRE - PHQ9
SUM OF ALL RESPONSES TO PHQ9 QUESTIONS 1 & 2: 0
1. LITTLE INTEREST OR PLEASURE IN DOING THINGS: 0
2. FEELING DOWN, DEPRESSED OR HOPELESS: 0
SUM OF ALL RESPONSES TO PHQ QUESTIONS 1-9: 0

## 2024-01-02 DIAGNOSIS — M25.561 CHRONIC PAIN OF RIGHT KNEE: ICD-10-CM

## 2024-01-02 DIAGNOSIS — G89.29 CHRONIC PAIN OF RIGHT KNEE: ICD-10-CM

## 2024-01-02 DIAGNOSIS — G89.29 CHRONIC PAIN OF LEFT KNEE: ICD-10-CM

## 2024-01-02 DIAGNOSIS — M25.562 CHRONIC PAIN OF LEFT KNEE: ICD-10-CM

## 2024-01-08 ENCOUNTER — OFFICE VISIT (OUTPATIENT)
Dept: FAMILY MEDICINE CLINIC | Facility: CLINIC | Age: 69
End: 2024-01-08
Payer: MEDICARE

## 2024-01-08 VITALS
DIASTOLIC BLOOD PRESSURE: 86 MMHG | SYSTOLIC BLOOD PRESSURE: 146 MMHG | HEIGHT: 63 IN | TEMPERATURE: 97.8 F | RESPIRATION RATE: 16 BRPM | OXYGEN SATURATION: 98 % | HEART RATE: 65 BPM | WEIGHT: 123 LBS | BODY MASS INDEX: 21.79 KG/M2

## 2024-01-08 DIAGNOSIS — G89.29 CHRONIC PAIN OF RIGHT KNEE: ICD-10-CM

## 2024-01-08 DIAGNOSIS — M25.562 CHRONIC PAIN OF LEFT KNEE: ICD-10-CM

## 2024-01-08 DIAGNOSIS — M25.561 CHRONIC PAIN OF RIGHT KNEE: ICD-10-CM

## 2024-01-08 DIAGNOSIS — G89.29 CHRONIC PAIN OF LEFT KNEE: ICD-10-CM

## 2024-01-08 PROCEDURE — 1090F PRES/ABSN URINE INCON ASSESS: CPT | Performed by: FAMILY MEDICINE

## 2024-01-08 PROCEDURE — 3017F COLORECTAL CA SCREEN DOC REV: CPT | Performed by: FAMILY MEDICINE

## 2024-01-08 PROCEDURE — G8399 PT W/DXA RESULTS DOCUMENT: HCPCS | Performed by: FAMILY MEDICINE

## 2024-01-08 PROCEDURE — G8420 CALC BMI NORM PARAMETERS: HCPCS | Performed by: FAMILY MEDICINE

## 2024-01-08 PROCEDURE — 99213 OFFICE O/P EST LOW 20 MIN: CPT | Performed by: FAMILY MEDICINE

## 2024-01-08 PROCEDURE — G8427 DOCREV CUR MEDS BY ELIG CLIN: HCPCS | Performed by: FAMILY MEDICINE

## 2024-01-08 PROCEDURE — G8484 FLU IMMUNIZE NO ADMIN: HCPCS | Performed by: FAMILY MEDICINE

## 2024-01-08 PROCEDURE — 1036F TOBACCO NON-USER: CPT | Performed by: FAMILY MEDICINE

## 2024-01-08 PROCEDURE — 1123F ACP DISCUSS/DSCN MKR DOCD: CPT | Performed by: FAMILY MEDICINE

## 2024-01-08 RX ORDER — MELOXICAM 7.5 MG/1
TABLET ORAL
Qty: 30 TABLET | Refills: 3 | Status: SHIPPED | OUTPATIENT
Start: 2024-01-08

## 2024-01-08 ASSESSMENT — PATIENT HEALTH QUESTIONNAIRE - PHQ9
SUM OF ALL RESPONSES TO PHQ QUESTIONS 1-9: 0
SUM OF ALL RESPONSES TO PHQ9 QUESTIONS 1 & 2: 0
SUM OF ALL RESPONSES TO PHQ QUESTIONS 1-9: 0
2. FEELING DOWN, DEPRESSED OR HOPELESS: 0
1. LITTLE INTEREST OR PLEASURE IN DOING THINGS: 0

## 2024-01-08 NOTE — PROGRESS NOTES
Housing Stability: Unknown (3/8/2023)    Housing Stability Vital Sign     Unstable Housing in the Last Year: No       Ms. Saul   Family History   Problem Relation Age of Onset    Breast Cancer Neg Hx     Colon Cancer Mother     Heart Surgery Father     Pacemaker Father     Hypertension Sister             Ms. Saul  has the following allergies: No Known Allergies    BP (!) 146/86   Pulse 65   Temp 97.8 °F (36.6 °C)   Resp 16   Ht 1.6 m (5' 3\")   Wt 55.8 kg (123 lb)   SpO2 98%   BMI 21.79 kg/m²     HEENT: Normocephalic, atraumatic, pupils equal and reactive to light.   Neck: Supple, no masses or thyromegaly.  Lungs: clear to auscultation bilaterally.  CV: regular rate and rhythm, without murmurs, rubs, or gallops  Left knee: No redness or swelling.  No joint line tenderness.  She does have full range of motion.  Right knee: No redness or swelling.  No joint line tenderness.  She does have full range of motion.  Ext: No lower extremity edema.    Lorena was seen today for hypertension, medication check and restless leg(s).    Diagnoses and all orders for this visit:    Chronic pain of right knee  -     meloxicam (MOBIC) 7.5 MG tablet; One po qam    Chronic pain of left knee  -     meloxicam (MOBIC) 7.5 MG tablet; One po qam    Do meloxicam in the morning as above and Advil 1-2 at bedtime.  If her legs still hurt in 1 month I want her to begin iron 325 mg daily to be sure she has no anemia because she has had that before.  Stop blood donations.  If she is still having trouble when she comes in for wellness visit in March we will need to send her for further diagnostics.  She does not have the urge to move her legs as  in restless leg so I am concerned it may actually be due to knee arthritis.    Selene Gilliam MD

## 2024-01-10 ENCOUNTER — HOSPITAL ENCOUNTER (OUTPATIENT)
Dept: MAMMOGRAPHY | Age: 69
Discharge: HOME | End: 2024-01-13
Attending: FAMILY MEDICINE
Payer: MEDICARE

## 2024-01-10 DIAGNOSIS — Z12.31 SCREENING MAMMOGRAM FOR HIGH-RISK PATIENT: ICD-10-CM

## 2024-01-10 PROCEDURE — 77067 SCR MAMMO BI INCL CAD: CPT

## 2024-03-13 ENCOUNTER — OFFICE VISIT (OUTPATIENT)
Dept: FAMILY MEDICINE CLINIC | Facility: CLINIC | Age: 69
End: 2024-03-13
Payer: MEDICARE

## 2024-03-13 VITALS
WEIGHT: 116.6 LBS | BODY MASS INDEX: 20.66 KG/M2 | DIASTOLIC BLOOD PRESSURE: 87 MMHG | OXYGEN SATURATION: 98 % | HEIGHT: 63 IN | TEMPERATURE: 97.3 F | HEART RATE: 72 BPM | RESPIRATION RATE: 16 BRPM | SYSTOLIC BLOOD PRESSURE: 137 MMHG

## 2024-03-13 DIAGNOSIS — G89.29 CHRONIC PAIN OF LEFT KNEE: ICD-10-CM

## 2024-03-13 DIAGNOSIS — I83.11 VARICOSE VEINS OF RIGHT LOWER EXTREMITY WITH INFLAMMATION: ICD-10-CM

## 2024-03-13 DIAGNOSIS — G89.29 CHRONIC PAIN OF RIGHT KNEE: ICD-10-CM

## 2024-03-13 DIAGNOSIS — Z29.11 NEED FOR RSV IMMUNIZATION: ICD-10-CM

## 2024-03-13 DIAGNOSIS — Z00.00 MEDICARE ANNUAL WELLNESS VISIT, SUBSEQUENT: Primary | ICD-10-CM

## 2024-03-13 DIAGNOSIS — M25.561 CHRONIC PAIN OF RIGHT KNEE: ICD-10-CM

## 2024-03-13 DIAGNOSIS — M25.562 CHRONIC PAIN OF LEFT KNEE: ICD-10-CM

## 2024-03-13 PROCEDURE — G8420 CALC BMI NORM PARAMETERS: HCPCS | Performed by: FAMILY MEDICINE

## 2024-03-13 PROCEDURE — G0439 PPPS, SUBSEQ VISIT: HCPCS | Performed by: FAMILY MEDICINE

## 2024-03-13 PROCEDURE — G8427 DOCREV CUR MEDS BY ELIG CLIN: HCPCS | Performed by: FAMILY MEDICINE

## 2024-03-13 PROCEDURE — 1123F ACP DISCUSS/DSCN MKR DOCD: CPT | Performed by: FAMILY MEDICINE

## 2024-03-13 PROCEDURE — 1090F PRES/ABSN URINE INCON ASSESS: CPT | Performed by: FAMILY MEDICINE

## 2024-03-13 PROCEDURE — G8399 PT W/DXA RESULTS DOCUMENT: HCPCS | Performed by: FAMILY MEDICINE

## 2024-03-13 PROCEDURE — 3017F COLORECTAL CA SCREEN DOC REV: CPT | Performed by: FAMILY MEDICINE

## 2024-03-13 PROCEDURE — G8484 FLU IMMUNIZE NO ADMIN: HCPCS | Performed by: FAMILY MEDICINE

## 2024-03-13 PROCEDURE — 1036F TOBACCO NON-USER: CPT | Performed by: FAMILY MEDICINE

## 2024-03-13 PROCEDURE — 99213 OFFICE O/P EST LOW 20 MIN: CPT | Performed by: FAMILY MEDICINE

## 2024-03-13 RX ORDER — MELOXICAM 7.5 MG/1
TABLET ORAL
Qty: 90 TABLET | Refills: 3 | Status: SHIPPED | OUTPATIENT
Start: 2024-03-13

## 2024-03-13 SDOH — ECONOMIC STABILITY: FOOD INSECURITY: WITHIN THE PAST 12 MONTHS, THE FOOD YOU BOUGHT JUST DIDN'T LAST AND YOU DIDN'T HAVE MONEY TO GET MORE.: NEVER TRUE

## 2024-03-13 SDOH — ECONOMIC STABILITY: FOOD INSECURITY: WITHIN THE PAST 12 MONTHS, YOU WORRIED THAT YOUR FOOD WOULD RUN OUT BEFORE YOU GOT MONEY TO BUY MORE.: NEVER TRUE

## 2024-03-13 SDOH — ECONOMIC STABILITY: INCOME INSECURITY: HOW HARD IS IT FOR YOU TO PAY FOR THE VERY BASICS LIKE FOOD, HOUSING, MEDICAL CARE, AND HEATING?: NOT HARD AT ALL

## 2024-03-13 ASSESSMENT — PATIENT HEALTH QUESTIONNAIRE - PHQ9
SUM OF ALL RESPONSES TO PHQ QUESTIONS 1-9: 0
SUM OF ALL RESPONSES TO PHQ QUESTIONS 1-9: 0
1. LITTLE INTEREST OR PLEASURE IN DOING THINGS: 0
SUM OF ALL RESPONSES TO PHQ QUESTIONS 1-9: 0
SUM OF ALL RESPONSES TO PHQ9 QUESTIONS 1 & 2: 0
SUM OF ALL RESPONSES TO PHQ QUESTIONS 1-9: 0
2. FEELING DOWN, DEPRESSED OR HOPELESS: 0

## 2024-03-13 ASSESSMENT — LIFESTYLE VARIABLES
HOW OFTEN DO YOU HAVE A DRINK CONTAINING ALCOHOL: 2-4 TIMES A MONTH
HOW MANY STANDARD DRINKS CONTAINING ALCOHOL DO YOU HAVE ON A TYPICAL DAY: 1 OR 2

## 2024-03-13 NOTE — PROGRESS NOTES
problems were reviewed today and where indicated follow up appointments were made and/or referrals ordered.    Positive Risk Factor Screenings with Interventions:                      Advanced Directives:  Do you have a Living Will?: (!) No    Intervention:  has NO advanced directive - information provided                     Objective   Vitals:    03/13/24 1550   BP: 137/87   Site: Right Upper Arm   Position: Sitting   Cuff Size: Medium Adult   Pulse: 72   Resp: 16   Temp: 97.3 °F (36.3 °C)   TempSrc: Temporal   SpO2: 98%   Weight: 52.9 kg (116 lb 9.6 oz)   Height: 1.6 m (5' 3\")      Body mass index is 20.65 kg/m².        Neck: neck supple and non tender without mass, no thyromegaly or thyroid nodules, no cervical lymphadenopathy   Pulmonary/Chest: clear to auscultation bilaterally- no wheezes, rales or rhonchi, normal air movement, no respiratory distress  Cardiovascular: normal rate, normal S1 and S2, and no gallops.  Large varicose vein in her right posterior thigh going down into the right posterior calf.  There is no redness but it does appear to be palpation.      No Known Allergies  Prior to Visit Medications    Medication Sig Taking? Authorizing Provider   meloxicam (MOBIC) 7.5 MG tablet One po qam Yes Selene Gilliam MD   respiratory syncytial vaccine, adjuvanted (AREXVY) 120 MCG/0.5ML injection Inject 0.5 mLs into the muscle once for 1 dose Yes Selene Gilliam MD       [facility] (Including outside providers/suppliers regularly involved in providing care):   Patient Care Team:  Selene Gilliam MD as PCP - General  Selene Gilliam MD as PCP - Empaneled Provider     Reviewed and updated this visit:  Tobacco  Allergies  Meds  Problems  Med Hx  Surg Hx  Soc Hx  Fam Hx

## 2025-01-06 ENCOUNTER — TRANSCRIBE ORDERS (OUTPATIENT)
Dept: SCHEDULING | Age: 70
End: 2025-01-06

## 2025-01-06 DIAGNOSIS — Z12.31 OTHER SCREENING MAMMOGRAM: Primary | ICD-10-CM

## 2025-01-16 ENCOUNTER — HOSPITAL ENCOUNTER (OUTPATIENT)
Dept: MAMMOGRAPHY | Age: 70
Discharge: HOME | End: 2025-01-19
Attending: FAMILY MEDICINE
Payer: MEDICARE

## 2025-01-16 DIAGNOSIS — Z12.31 OTHER SCREENING MAMMOGRAM: ICD-10-CM

## 2025-01-16 PROCEDURE — 77063 BREAST TOMOSYNTHESIS BI: CPT

## 2025-03-18 ENCOUNTER — OFFICE VISIT (OUTPATIENT)
Dept: FAMILY MEDICINE CLINIC | Facility: CLINIC | Age: 70
End: 2025-03-18
Payer: MEDICARE

## 2025-03-18 VITALS
TEMPERATURE: 98.3 F | HEIGHT: 63 IN | OXYGEN SATURATION: 98 % | HEART RATE: 74 BPM | DIASTOLIC BLOOD PRESSURE: 100 MMHG | SYSTOLIC BLOOD PRESSURE: 130 MMHG | BODY MASS INDEX: 20.98 KG/M2 | WEIGHT: 118.4 LBS

## 2025-03-18 DIAGNOSIS — Z23 NEED FOR TDAP VACCINATION: ICD-10-CM

## 2025-03-18 DIAGNOSIS — G89.29 CHRONIC PAIN OF RIGHT KNEE: ICD-10-CM

## 2025-03-18 DIAGNOSIS — M25.561 CHRONIC PAIN OF RIGHT KNEE: ICD-10-CM

## 2025-03-18 DIAGNOSIS — M25.562 CHRONIC PAIN OF LEFT KNEE: ICD-10-CM

## 2025-03-18 DIAGNOSIS — G89.29 CHRONIC PAIN OF LEFT KNEE: ICD-10-CM

## 2025-03-18 DIAGNOSIS — Z00.00 MEDICARE ANNUAL WELLNESS VISIT, SUBSEQUENT: Primary | ICD-10-CM

## 2025-03-18 PROCEDURE — 1090F PRES/ABSN URINE INCON ASSESS: CPT | Performed by: FAMILY MEDICINE

## 2025-03-18 PROCEDURE — 1036F TOBACCO NON-USER: CPT | Performed by: FAMILY MEDICINE

## 2025-03-18 PROCEDURE — G8427 DOCREV CUR MEDS BY ELIG CLIN: HCPCS | Performed by: FAMILY MEDICINE

## 2025-03-18 PROCEDURE — 99213 OFFICE O/P EST LOW 20 MIN: CPT | Performed by: FAMILY MEDICINE

## 2025-03-18 PROCEDURE — G8399 PT W/DXA RESULTS DOCUMENT: HCPCS | Performed by: FAMILY MEDICINE

## 2025-03-18 PROCEDURE — 1160F RVW MEDS BY RX/DR IN RCRD: CPT | Performed by: FAMILY MEDICINE

## 2025-03-18 PROCEDURE — 1123F ACP DISCUSS/DSCN MKR DOCD: CPT | Performed by: FAMILY MEDICINE

## 2025-03-18 PROCEDURE — 90471 IMMUNIZATION ADMIN: CPT | Performed by: FAMILY MEDICINE

## 2025-03-18 PROCEDURE — 1159F MED LIST DOCD IN RCRD: CPT | Performed by: FAMILY MEDICINE

## 2025-03-18 PROCEDURE — 3017F COLORECTAL CA SCREEN DOC REV: CPT | Performed by: FAMILY MEDICINE

## 2025-03-18 PROCEDURE — G0439 PPPS, SUBSEQ VISIT: HCPCS | Performed by: FAMILY MEDICINE

## 2025-03-18 PROCEDURE — G8420 CALC BMI NORM PARAMETERS: HCPCS | Performed by: FAMILY MEDICINE

## 2025-03-18 PROCEDURE — 90715 TDAP VACCINE 7 YRS/> IM: CPT | Performed by: FAMILY MEDICINE

## 2025-03-18 RX ORDER — MELOXICAM 7.5 MG/1
TABLET ORAL
Qty: 90 TABLET | Refills: 3 | Status: SHIPPED | OUTPATIENT
Start: 2025-03-18

## 2025-03-18 SDOH — ECONOMIC STABILITY: FOOD INSECURITY: WITHIN THE PAST 12 MONTHS, YOU WORRIED THAT YOUR FOOD WOULD RUN OUT BEFORE YOU GOT MONEY TO BUY MORE.: NEVER TRUE

## 2025-03-18 SDOH — ECONOMIC STABILITY: FOOD INSECURITY: WITHIN THE PAST 12 MONTHS, THE FOOD YOU BOUGHT JUST DIDN'T LAST AND YOU DIDN'T HAVE MONEY TO GET MORE.: NEVER TRUE

## 2025-03-18 ASSESSMENT — ANXIETY QUESTIONNAIRES
4. TROUBLE RELAXING: SEVERAL DAYS
3. WORRYING TOO MUCH ABOUT DIFFERENT THINGS: NOT AT ALL
6. BECOMING EASILY ANNOYED OR IRRITABLE: NOT AT ALL
2. NOT BEING ABLE TO STOP OR CONTROL WORRYING: NOT AT ALL
7. FEELING AFRAID AS IF SOMETHING AWFUL MIGHT HAPPEN: NOT AT ALL
1. FEELING NERVOUS, ANXIOUS, OR ON EDGE: NOT AT ALL
GAD7 TOTAL SCORE: 2
5. BEING SO RESTLESS THAT IT IS HARD TO SIT STILL: SEVERAL DAYS

## 2025-03-18 ASSESSMENT — PATIENT HEALTH QUESTIONNAIRE - PHQ9
SUM OF ALL RESPONSES TO PHQ QUESTIONS 1-9: 0
SUM OF ALL RESPONSES TO PHQ QUESTIONS 1-9: 0
2. FEELING DOWN, DEPRESSED OR HOPELESS: NOT AT ALL
1. LITTLE INTEREST OR PLEASURE IN DOING THINGS: NOT AT ALL
SUM OF ALL RESPONSES TO PHQ QUESTIONS 1-9: 0
SUM OF ALL RESPONSES TO PHQ QUESTIONS 1-9: 0

## 2025-03-18 NOTE — PROGRESS NOTES
Medicare Annual Wellness Visit    Lorena Saul is here for Medicare AWV    Assessment & Plan  Annual Wellness Visit.    1. Health maintenance.  Her cholesterol levels were slightly elevated in the previous year's blood work, but her HDL was commendably high. She has a living will in place, which was updated last year. She is advised to continue her current regimen and focus on dietary improvements. A tetanus booster will be administered today. She is advised to undergo annual kidney function tests due to her use of meloxicam.    2. Insomnia.  She is advised to experiment with different dosages of melatonin to find the most effective one. She is also encouraged to avoid screen time an hour before bedtime and engage in calming activities such as listening to music. She is further advised to associate her bedroom solely with sleep and sexual activity. She is advised to continue using Benadryl as needed for sleep.    3. Arthritis.  She is advised to take Tylenol at bedtime for joint discomfort. If necessary, she may increase her meloxicam dosage to twice daily.    4. Elevated blood pressure.  She is advised to monitor her blood pressure at home and maintain a record. She is also encouraged to engage in cardiovascular exercises for 30 minutes, five days a week. If her blood pressure consistently measures in the 140s over 90s at home, she is instructed to schedule a follow-up appointment in one month.    Medicare annual wellness visit, subsequent  Chronic pain of right knee  -     meloxicam (MOBIC) 7.5 MG tablet; One po qam, Disp-90 tablet, R-3Normal  Chronic pain of left knee  -     meloxicam (MOBIC) 7.5 MG tablet; One po qam, Disp-90 tablet, R-3Normal  Need for Tdap vaccination  -     Tdap, BOOSTRIX, (age 10 yrs+), IM    Results  Laboratory Studies  Cholesterol was slightly high last year. HDL was high.       Return in about 1 year (around 3/18/2026).     Subjective     History of Present Illness  The patient is a

## 2025-05-07 ENCOUNTER — TELEPHONE (OUTPATIENT)
Dept: FAMILY MEDICINE CLINIC | Facility: CLINIC | Age: 70
End: 2025-05-07

## 2025-05-07 NOTE — TELEPHONE ENCOUNTER
Pt brought by a copy of her lab results that she didn't bring to her appt in 03/25 pt is requesting pcp review. Placed in MD sign